# Patient Record
Sex: MALE | Race: BLACK OR AFRICAN AMERICAN | NOT HISPANIC OR LATINO | URBAN - METROPOLITAN AREA
[De-identification: names, ages, dates, MRNs, and addresses within clinical notes are randomized per-mention and may not be internally consistent; named-entity substitution may affect disease eponyms.]

---

## 2023-06-05 ENCOUNTER — EMERGENCY (EMERGENCY)
Facility: HOSPITAL | Age: 58
LOS: 0 days | Discharge: HOME | End: 2023-06-05
Attending: STUDENT IN AN ORGANIZED HEALTH CARE EDUCATION/TRAINING PROGRAM
Payer: SELF-PAY

## 2023-06-05 VITALS
RESPIRATION RATE: 16 BRPM | TEMPERATURE: 99 F | OXYGEN SATURATION: 98 % | SYSTOLIC BLOOD PRESSURE: 218 MMHG | HEIGHT: 69 IN | DIASTOLIC BLOOD PRESSURE: 117 MMHG | WEIGHT: 160.06 LBS | HEART RATE: 63 BPM

## 2023-06-05 VITALS
SYSTOLIC BLOOD PRESSURE: 188 MMHG | DIASTOLIC BLOOD PRESSURE: 87 MMHG | OXYGEN SATURATION: 99 % | HEART RATE: 60 BPM | TEMPERATURE: 98 F | RESPIRATION RATE: 17 BRPM
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
## 2023-06-05 DIAGNOSIS — Z23 ENCOUNTER FOR IMMUNIZATION: ICD-10-CM
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
## 2023-06-05 DIAGNOSIS — I10 ESSENTIAL (PRIMARY) HYPERTENSION: ICD-10-CM

## 2023-06-05 DIAGNOSIS — S01.112A LACERATION WITHOUT FOREIGN BODY OF LEFT EYELID AND PERIOCULAR AREA, INITIAL ENCOUNTER: ICD-10-CM
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
## 2023-06-05 PROCEDURE — 12011 RPR F/E/E/N/L/M 2.5 CM/<: CPT

## 2023-06-05 PROCEDURE — 93010 ELECTROCARDIOGRAM REPORT: CPT

## 2023-06-05 PROCEDURE — 99284 EMERGENCY DEPT VISIT MOD MDM: CPT | Mod: 25

## 2023-06-05 PROCEDURE — 99053 MED SERV 10PM-8AM 24 HR FAC: CPT

## 2023-06-05 RX ORDER — TETANUS TOXOID, REDUCED DIPHTHERIA TOXOID AND ACELLULAR PERTUSSIS VACCINE, ADSORBED 5; 2.5; 8; 8; 2.5 [IU]/.5ML; [IU]/.5ML; UG/.5ML; UG/.5ML; UG/.5ML
0.5 SUSPENSION INTRAMUSCULAR ONCE
Refills: 0 | Status: COMPLETED | OUTPATIENT
Start: 2023-06-05 | End: 2023-06-05

## 2023-06-05 RX ORDER — NIFEDIPINE 30 MG
60 TABLET, EXTENDED RELEASE 24 HR ORAL ONCE
Refills: 0 | Status: DISCONTINUED | OUTPATIENT
Start: 2023-06-05 | End: 2023-06-05

## 2023-06-05 RX ORDER — HYDRALAZINE HCL 50 MG
50 TABLET ORAL ONCE
Refills: 0 | Status: COMPLETED | OUTPATIENT
Start: 2023-06-05 | End: 2023-06-05

## 2023-06-05 RX ORDER — HYDRALAZINE HCL 50 MG
1 TABLET ORAL
Qty: 90 | Refills: 0
Start: 2023-06-05 | End: 2023-07-04

## 2023-06-05 RX ORDER — AMLODIPINE BESYLATE 2.5 MG/1
1 TABLET ORAL
Qty: 30 | Refills: 0
Start: 2023-06-05 | End: 2023-07-04

## 2023-06-05 RX ADMIN — Medication 50 MILLIGRAM(S): at 03:20

## 2023-06-05 RX ADMIN — TETANUS TOXOID, REDUCED DIPHTHERIA TOXOID AND ACELLULAR PERTUSSIS VACCINE, ADSORBED 0.5 MILLILITER(S): 5; 2.5; 8; 8; 2.5 SUSPENSION INTRAMUSCULAR at 02:47

## 2023-06-05 NOTE — ED PROVIDER NOTE - CLINICAL SUMMARY MEDICAL DECISION MAKING FREE TEXT BOX
Pt here with small laceration lateral to R eyebrow. No obvious deformities. NO neurologic deficits. Wound repaired. Gauze with lido w/ epi held to wound to aid with bleeding. Was to attempt ligature of artery with figure of 8 stitch, but wound small and superficial and bleeding stopped with gauze and lido w/ epi and local infiltration. Wound repaired with 4 6-0 interrupted sutures. To return in 7 days for removal. Pt here with small laceration lateral to R eyebrow. No obvious deformities. NO neurologic deficits. Wound repaired. Gauze with lido w/ epi held to wound to aid with bleeding. Was to attempt ligature of artery with figure of 8 stitch, but wound small and superficial and bleeding stopped with gauze and lido w/ epi and local infiltration. Wound repaired with 4 6-0 interrupted sutures. To return in 7 days for removal.  Pt hypertensive in traige. Known hypertensive, has not seen pmd in years and does not take meds. Asymptomatic. EKG:   Will give pmd f/u.
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
## 2023-06-05 NOTE — ED ADULT NURSE NOTE - NSFALLUNIVINTERV_ED_ALL_ED
Bed/Stretcher in lowest position, wheels locked, appropriate side rails in place/Call bell, personal items and telephone in reach/Instruct patient to call for assistance before getting out of bed/chair/stretcher/Non-slip footwear applied when patient is off stretcher/[place] to call system/Physically safe environment - no spills, clutter or unnecessary equipment/Purposeful proactive rounding/Room/bathroom lighting operational, light cord in reach

## 2023-06-05 NOTE — ED PROVIDER NOTE - PHYSICAL EXAMINATION
HEENT: small 1 cm laceration lateral R eyebrow. Arterial bleed not easily controlled with pressure, EOMI, PERRL, no orbital rim tenderness  Resp: no respiratory distress, tachypnea or accessory muscle use  Neuro: AxOx3, speaking full sentences, steady gait, face symmetric

## 2023-06-05 NOTE — ED PROVIDER NOTE - PATIENT PORTAL LINK FT
You can access the FollowMyHealth Patient Portal offered by [facility] by registering at the following website: [email or web address]. By joining [employer]’s FollowMyHealth portal, you will also be able to view your health information using other applications (apps) compatible with our system. You can access the FollowMyHealth Patient Portal offered by [facility] by registering at the following website: [email or web address]. By joining [employer]’s FollowMyHealth portal, you will also be able to view your health information using other applications (apps) compatible with our system.
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
## 2023-06-05 NOTE — ED PROVIDER NOTE - NSFOLLOWUPINSTRUCTIONS_ED_ALL_ED_FT
You were evaluated in the ER for a facial laceration that was cleaned and repaired. Keep wound clean, dry, and covered first 24 hours. After which if you get the area wet, dry immediately. Your tetanus vaccine was updated. Return to the ER in 7 days for removal of stitches. Return sooner for worsening pain, redness around area, or drainage from wound. You were evaluated in the ER for a facial laceration that was cleaned and repaired. Keep wound clean, dry, and covered first 24 hours. After which if you get the area wet, dry immediately. Your tetanus vaccine was updated. Return to the ER in 7 days for removal of stitches. Return sooner for worsening pain, redness around area, or drainage from wound.    Your blood pressure was elevated in the ER. It is important you follow-up with a primary care doctor for further evaluation even if you have no symptoms. A low dose blood pressure medication has been sent to your pharmacy and your primary care doctor can re-prescribe when needed.